# Patient Record
Sex: FEMALE | Race: WHITE | NOT HISPANIC OR LATINO | Employment: FULL TIME | URBAN - METROPOLITAN AREA
[De-identification: names, ages, dates, MRNs, and addresses within clinical notes are randomized per-mention and may not be internally consistent; named-entity substitution may affect disease eponyms.]

---

## 2017-09-28 ENCOUNTER — OFFICE VISIT (OUTPATIENT)
Dept: RETAIL CLINIC | Facility: CLINIC | Age: 22
End: 2017-09-28

## 2017-09-28 DIAGNOSIS — Z02.83 ENCOUNTER FOR DRUG SCREENING: Primary | ICD-10-CM

## 2020-02-14 ENCOUNTER — OFFICE VISIT (OUTPATIENT)
Dept: FAMILY MEDICINE CLINIC | Facility: CLINIC | Age: 25
End: 2020-02-14

## 2020-02-14 VITALS
HEART RATE: 75 BPM | WEIGHT: 134.6 LBS | HEIGHT: 69 IN | DIASTOLIC BLOOD PRESSURE: 62 MMHG | SYSTOLIC BLOOD PRESSURE: 112 MMHG | TEMPERATURE: 98 F | BODY MASS INDEX: 19.93 KG/M2 | OXYGEN SATURATION: 99 %

## 2020-02-14 DIAGNOSIS — R79.89 ELEVATED PROLACTIN LEVEL: ICD-10-CM

## 2020-02-14 DIAGNOSIS — E28.2 PCOS (POLYCYSTIC OVARIAN SYNDROME): ICD-10-CM

## 2020-02-14 DIAGNOSIS — R35.0 FREQUENCY OF URINATION: ICD-10-CM

## 2020-02-14 DIAGNOSIS — R19.8 ALTERNATING CONSTIPATION AND DIARRHEA: Primary | ICD-10-CM

## 2020-02-14 PROBLEM — R87.810 ASCUS WITH POSITIVE HIGH RISK HPV CERVICAL: Status: ACTIVE | Noted: 2018-08-01

## 2020-02-14 PROBLEM — R87.610 ASCUS WITH POSITIVE HIGH RISK HPV CERVICAL: Status: ACTIVE | Noted: 2018-08-01

## 2020-02-14 LAB
BILIRUB BLD-MCNC: NEGATIVE MG/DL
GLUCOSE UR STRIP-MCNC: NEGATIVE MG/DL
KETONES UR QL: ABNORMAL
LEUKOCYTE EST, POC: NEGATIVE
NITRITE UR-MCNC: NEGATIVE MG/ML
PH UR: 7.5 [PH] (ref 5–8)
PROT UR STRIP-MCNC: ABNORMAL MG/DL
RBC # UR STRIP: NEGATIVE /UL
SP GR UR: 1.01 (ref 1–1.03)
UROBILINOGEN UR QL: NORMAL

## 2020-02-14 PROCEDURE — 99214 OFFICE O/P EST MOD 30 MIN: CPT | Performed by: NURSE PRACTITIONER

## 2020-02-14 PROCEDURE — 81002 URINALYSIS NONAUTO W/O SCOPE: CPT | Performed by: NURSE PRACTITIONER

## 2020-02-14 RX ORDER — MEDROXYPROGESTERONE ACETATE 10 MG/1
TABLET ORAL
COMMUNITY
Start: 2020-01-28 | End: 2020-10-16

## 2020-02-14 NOTE — PROGRESS NOTES
Subjective   Mariama Mallory is a 24 y.o. female.     Chief Complaint   Patient presents with   • Establish Care   • GI Problem     shaky, sweaty        History of Present Illness     Patient is her today to establish care as a new patient.  She was previous with a doctor in [place].  Has lived here 5 years and is just establishing with me as PCP.       PCOS- on provera recently diagnosed.  Will probably do 4 times yearly.  Sees julianna in [place].    She also just diagnosed her with elevated prolactin levels and is working her up for that.      Always had irregular periods, then was on several forms of birth control.  When came off, she wasn't having periods at all.  This took her to ob/gyn and got her diagnosed with pcos.  When she had Nexplanon removed in august she lost 8 lbs in a week and then has lost 15 lbs all together since august without trying.    Before that she cut out grains.      Has had a couple occasion with each hand falling asleep and then once with arm falling asleep in the middle of the night.     Once in the past 2 weeks had a sweaty issue and hands shaking  Then she played volleyball and had another shaking episode.       Past several years had GI issues.  Has tried to cut out dairy in last 3 years. This seemed to helped, but has chronic constipation or diarrhea that comes and goes.     She cut out grains in august and improved, but is still inconsistent.     Urination: goes 11-15 times during the day and then 1-2 times during the night gets up to urinate  Drinks about 48 ounces water daily and then 1 cup coffee daily.        The following portions of the patient's history were reviewed and updated as appropriate: allergies, current medications, past family history, past medical history, past social history, past surgical history and problem list.    Past Medical History:   Diagnosis Date   • Elevated prolactin level (CMS/[facility])    • PCOS (polycystic ovarian syndrome)        Past Surgical  "History:   Procedure Laterality Date   • DENTAL PROCEDURE         Family History   Problem Relation Age of Onset   • No Known Problems Mother    • No Known Problems Father    • Diabetes type II Paternal Grandmother    • Diabetes type II Other         aunt       Social History     Socioeconomic History   • Marital status:      Spouse name: Not on file   • Number of children: Not on file   • Years of education: Not on file   • Highest education level: Not on file   Tobacco Use   • Smoking status: Never Smoker   • Smokeless tobacco: Never Used   Substance and Sexual Activity   • Alcohol use: No   • Drug use: Never   • Sexual activity: Yes     Partners: Male     Birth control/protection: None         Current Outpatient Medications:   •  medroxyPROGESTERone (PROVERA) 10 MG tablet, TAKE ONE TABLET BY MOUTH DAILY FOR 10 DAYS, Disp: , Rfl:     Review of Systems   Constitutional: Negative for fatigue.   Respiratory: Negative for cough, shortness of breath and wheezing.    Cardiovascular: Negative for chest pain.   Gastrointestinal: Positive for constipation, diarrhea and nausea (occassionally). Negative for abdominal pain, blood in stool, vomiting, GERD and indigestion.   Genitourinary: Positive for frequency. Negative for dysuria, urgency, vaginal discharge and vaginal pain.        Ovulation pain on the right side   Skin: Negative.    Neurological: Positive for dizziness (with shakiness x 2 times). Negative for syncope and headache.   Psychiatric/Behavioral: Negative for suicidal ideas and depressed mood. The patient is not nervous/anxious.        Objective   Vitals:    02/14/20 1621   BP: 112/62   Pulse: 75   Temp: 98 °F (36.7 °C)   SpO2: 99%   Weight: 61.1 kg (134 lb 9.6 oz)   Height: 175.3 cm (69\")     Body mass index is 19.88 kg/m².  Physical Exam   Constitutional: She is oriented to person, place, and time. She appears well-developed and well-nourished.   Cardiovascular: Normal rate, regular rhythm, normal heart " sounds and intact distal pulses.   Pulmonary/Chest: Effort normal and breath sounds normal.   Abdominal: Soft. Bowel sounds are normal. There is no tenderness.   Neurological: She is alert and oriented to person, place, and time.   Skin: Skin is warm and dry.   Psychiatric: She has a normal mood and affect. Her behavior is normal. Judgment and thought content normal.         Assessment/Plan   Mariama was seen today for establish care and gi problem.    Diagnoses and all orders for this visit:    Alternating constipation and diarrhea  -     Ambulatory Referral to Gastroenterology  -     Hemoglobin A1c    Elevated prolactin level (CMS/HCC)  -     CBC & Differential  -     Comprehensive Metabolic Panel  -     Vitamin B12  -     Vitamin D 25 Hydroxy  -     Cancel: POC Glycosylated Hemoglobin (Hb A1C)  -     POCT urinalysis dipstick, manual  -     Hemoglobin A1c    PCOS (polycystic ovarian syndrome)  -     CBC & Differential  -     Comprehensive Metabolic Panel  -     Vitamin B12  -     Vitamin D 25 Hydroxy  -     Cancel: POC Glycosylated Hemoglobin (Hb A1C)  -     Hemoglobin A1c    Frequency of urination  -     CBC & Differential  -     Comprehensive Metabolic Panel  -     Vitamin B12  -     Vitamin D 25 Hydroxy  -     Cancel: POC Glycosylated Hemoglobin (Hb A1C)  -     POCT urinalysis dipstick, manual  -     Hemoglobin A1c      Discussed with patient the possible issues with her GI.  Since this is been going on for so long I would recommend that she get into GI for further evaluation.  Patient is agreeable to this.  I did discuss with her that they may possibly want to do a colonoscopy.  Discussed her with her with this but entail.    Discussed with her several issues that could be causing her symptoms.  We will start with a blood panel to rule out any issues.  We will go from there.  For frequency of urination I am going to check a UA even though she is not having any other symptoms for completeness sake.  Since she  "is having the shaking episodes I discussed it could be a blood sugar drop but I am going to check labs to see if this is an issue.    Discussed with her that weight loss could be attributed to her grain removal from diet, especially since she is replacing with healthy options.  I discussed with her that since she has removed grains and dairy I think GI would be the next step for that instead of an allergist.  The GI may recommend going to see allergist as well.    We will call with results of labs and any changes needed to plan of care.           Patient Instructions   Diet for Irritable Bowel Syndrome  When you have irritable bowel syndrome (IBS), it is very important to eat the foods and follow the eating habits that are best for your condition. IBS may cause various symptoms such as pain in the abdomen, constipation, or diarrhea. Choosing the right foods can help to ease the discomfort from these symptoms. Work with your health care provider and diet and nutrition specialist (dietitian) to find the eating plan that will help to control your symptoms.  What are tips for following this plan?         · Keep a food diary. This will help you identify foods that cause symptoms. Write down:  ? What you eat and when you eat it.  ? What symptoms you have.  ? When symptoms occur in relation to your meals, such as \"pain in abdomen 2 hours after dinner.\"  · Eat your meals slowly and in a relaxed setting.  · Aim to eat 5-6 small meals per day. Do not skip meals.  · Drink enough fluid to keep your urine pale yellow.  · Ask your health care provider if you should take an over-the-counter probiotic to help restore healthy bacteria in your gut (digestive tract).  ? Probiotics are foods that contain good bacteria and yeasts.  · Your dietitian may have specific dietary recommendations for you based on your symptoms. He or she may recommend that you:  ? Avoid foods that cause symptoms. Talk with your dietitian about other ways to " get the same nutrients that are in those problem foods.  ? Avoid foods with gluten. Gluten is a protein that is found in rye, wheat, and barley.  ? Eat more foods that contain soluble fiber. Examples of foods with high soluble fiber include oats, seeds, and certain fruits and vegetables. Take a fiber supplement if directed by your dietitian.  ? Reduce or avoid certain foods called FODMAPs. These are foods that contain carbohydrates that are hard to digest. Ask your doctor which foods contain these carbohydrates.  What foods are not recommended?  The following are some foods and drinks that may make your symptoms worse:  · Fatty foods, such as french fries.  · Foods that contain gluten, such as pasta and cereal.  · Dairy products, such as milk, cheese, and ice cream.  · Chocolate.  · Alcohol.  · Products with caffeine, such as coffee.  · Carbonated drinks, such as soda.  · Foods that are high in FODMAPs. These include certain fruits and vegetables.  · Products with sweeteners such as honey, high fructose corn syrup, sorbitol, and mannitol.  The items listed above may not be a complete list of foods and beverages you should avoid. Contact a dietitian for more information.  What foods are good sources of fiber?  Your health care provider or dietitian may recommend that you eat more foods that contain fiber. Fiber can help to reduce constipation and other IBS symptoms. Add foods with fiber to your diet a little at a time so your body can get used to them. Too much fiber at one time might cause gas and swelling of your abdomen. The following are some foods that are good sources of fiber:  · Berries, such as raspberries, strawberries, and blueberries.  · Tomatoes.  · Carrots.  · Brown rice.  · Oats.  · Seeds, such as marcy and pumpkin seeds.  The items listed above may not be a complete list of recommended sources of fiber. Contact your dietitian for more options.  Where to find more information  · International Foundation  for Functional Gastrointestinal Disorders: www.iffgd.org  · National [place] of Diabetes and Digestive and Kidney Diseases: www.niddk.nih.gov  Summary  · When you have irritable bowel syndrome (IBS), it is very important to eat the foods and follow the eating habits that are best for your condition.  · IBS may cause various symptoms such as pain in the abdomen, constipation, or diarrhea.  · Choosing the right foods can help to ease the discomfort that comes from symptoms.  · Keep a food diary. This will help you identify foods that cause symptoms.  · Your health care provider or diet and nutrition specialist (dietitian) may recommend that you eat more foods that contain fiber.  This information is not intended to replace advice given to you by your health care provider. Make sure you discuss any questions you have with your health care provider.  Document Released: 03/09/2005 Document Revised: 07/15/2019 Document Reviewed: 08/21/2018  [employer] Interactive Patient Education © 2019 [employer]        Prolactin Level Test  Why am I having this test?  The prolactin level test is often used to diagnose and monitor problems with the pituitary gland, such as pituitary tumors. It may also be used to help find the cause of certain other conditions, such as an abnormal absence of menstrual cycles (amenorrhea) or a thyroid gland that does not produce enough hormones (hypothyroidism).  Your health care provider may order this test if you have:  · Irregular menstrual periods.  · Loss of libido.  · Milky fluid coming from your nipples (when not breastfeeding).  · Fatigue.  What is being tested?  This test measures the amount of prolactin in your blood. Prolactin is a hormone that is produced by the pituitary gland. Prolactin levels normally go up and down (fluctuate) due to stress, illness, trauma, or surgery. Increased levels can also be caused by tumors or other health problems.  What kind of sample is taken?    A blood  sample is required for this test. It is usually collected by inserting a needle into a blood vessel.  Tell a health care provider about:  · All medicines you are taking, including vitamins, herbs, eye drops, creams, and over-the-counter medicines.  How are the results reported?  Your test results will be reported as values that indicate the amount of prolactin in your blood. Your health care provider will compare your results to normal ranges that were established after testing a large group of people (reference ranges). Reference ranges may vary among labs and hospitals. For this test, common reference ranges are:  · Adult male: 3-13 ng/mL.  · Adult female: 3-27 ng/mL.  · Pregnant female:  ng/mL.  What do the results mean?  Increased levels of prolactin may mean that you have:  · A pituitary gland tumor.  · Amenorrhea.  · Hypothyroidism.  · Certain pituitary or reproductive syndromes.  · Kidney failure.  Decreased levels of prolactin may indicate:  · Lack of blood to the pituitary gland.  · Pituitary gland failure.  Talk with your health care provider about what your results mean.  Questions to ask your health care provider  Ask your health care provider, or the department that is doing the test:  · When will my results be ready?  · How will I get my results?  · What are my treatment options?  · What other tests do I need?  · What are my next steps?  Summary  · The prolactin level test is often used to diagnose and monitor problems with the pituitary gland, such as pituitary tumors. It may also be used to help find the cause of certain other conditions, such as amenorrhea or hypothyroidism.  · This test measures the amount of prolactin in your blood. Prolactin is a hormone that is produced by the pituitary gland.  · Prolactin levels normally go up and down (fluctuate) due to stress, illness, trauma, or surgery. Increased levels can also be caused by tumors or other health problems.  · Talk with your health  care provider about what your results mean.  This information is not intended to replace advice given to you by your health care provider. Make sure you discuss any questions you have with your health care provider.  Document Released: 01/20/2006 Document Revised: 08/13/2018 Document Reviewed: 08/13/2018  [employer]vier Interactive Patient Education © 2019 [employer]

## 2020-02-14 NOTE — PATIENT INSTRUCTIONS
"Diet for Irritable Bowel Syndrome  When you have irritable bowel syndrome (IBS), it is very important to eat the foods and follow the eating habits that are best for your condition. IBS may cause various symptoms such as pain in the abdomen, constipation, or diarrhea. Choosing the right foods can help to ease the discomfort from these symptoms. Work with your health care provider and diet and nutrition specialist (dietitian) to find the eating plan that will help to control your symptoms.  What are tips for following this plan?         · Keep a food diary. This will help you identify foods that cause symptoms. Write down:  ? What you eat and when you eat it.  ? What symptoms you have.  ? When symptoms occur in relation to your meals, such as \"pain in abdomen 2 hours after dinner.\"  · Eat your meals slowly and in a relaxed setting.  · Aim to eat 5-6 small meals per day. Do not skip meals.  · Drink enough fluid to keep your urine pale yellow.  · Ask your health care provider if you should take an over-the-counter probiotic to help restore healthy bacteria in your gut (digestive tract).  ? Probiotics are foods that contain good bacteria and yeasts.  · Your dietitian may have specific dietary recommendations for you based on your symptoms. He or she may recommend that you:  ? Avoid foods that cause symptoms. Talk with your dietitian about other ways to get the same nutrients that are in those problem foods.  ? Avoid foods with gluten. Gluten is a protein that is found in rye, wheat, and barley.  ? Eat more foods that contain soluble fiber. Examples of foods with high soluble fiber include oats, seeds, and certain fruits and vegetables. Take a fiber supplement if directed by your dietitian.  ? Reduce or avoid certain foods called FODMAPs. These are foods that contain carbohydrates that are hard to digest. Ask your doctor which foods contain these carbohydrates.  What foods are not recommended?  The following are some " foods and drinks that may make your symptoms worse:  · Fatty foods, such as french fries.  · Foods that contain gluten, such as pasta and cereal.  · Dairy products, such as milk, cheese, and ice cream.  · Chocolate.  · Alcohol.  · Products with caffeine, such as coffee.  · Carbonated drinks, such as soda.  · Foods that are high in FODMAPs. These include certain fruits and vegetables.  · Products with sweeteners such as honey, high fructose corn syrup, sorbitol, and mannitol.  The items listed above may not be a complete list of foods and beverages you should avoid. Contact a dietitian for more information.  What foods are good sources of fiber?  Your health care provider or dietitian may recommend that you eat more foods that contain fiber. Fiber can help to reduce constipation and other IBS symptoms. Add foods with fiber to your diet a little at a time so your body can get used to them. Too much fiber at one time might cause gas and swelling of your abdomen. The following are some foods that are good sources of fiber:  · Berries, such as raspberries, strawberries, and blueberries.  · Tomatoes.  · Carrots.  · Brown rice.  · Oats.  · Seeds, such as marcy and pumpkin seeds.  The items listed above may not be a complete list of recommended sources of fiber. Contact your dietitian for more options.  Where to find more information  · International Foundation for Functional Gastrointestinal Disorders: www.iffgd.org  · National [place] of Diabetes and Digestive and Kidney Diseases: www.niddk.nih.gov  Summary  · When you have irritable bowel syndrome (IBS), it is very important to eat the foods and follow the eating habits that are best for your condition.  · IBS may cause various symptoms such as pain in the abdomen, constipation, or diarrhea.  · Choosing the right foods can help to ease the discomfort that comes from symptoms.  · Keep a food diary. This will help you identify foods that cause symptoms.  · Your health  care provider or diet and nutrition specialist (dietitian) may recommend that you eat more foods that contain fiber.  This information is not intended to replace advice given to you by your health care provider. Make sure you discuss any questions you have with your health care provider.  Document Released: 03/09/2005 Document Revised: 07/15/2019 Document Reviewed: 08/21/2018  [employer] Interactive Patient Education © 2019 [employer].        Prolactin Level Test  Why am I having this test?  The prolactin level test is often used to diagnose and monitor problems with the pituitary gland, such as pituitary tumors. It may also be used to help find the cause of certain other conditions, such as an abnormal absence of menstrual cycles (amenorrhea) or a thyroid gland that does not produce enough hormones (hypothyroidism).  Your health care provider may order this test if you have:  · Irregular menstrual periods.  · Loss of libido.  · Milky fluid coming from your nipples (when not breastfeeding).  · Fatigue.  What is being tested?  This test measures the amount of prolactin in your blood. Prolactin is a hormone that is produced by the pituitary gland. Prolactin levels normally go up and down (fluctuate) due to stress, illness, trauma, or surgery. Increased levels can also be caused by tumors or other health problems.  What kind of sample is taken?    A blood sample is required for this test. It is usually collected by inserting a needle into a blood vessel.  Tell a health care provider about:  · All medicines you are taking, including vitamins, herbs, eye drops, creams, and over-the-counter medicines.  How are the results reported?  Your test results will be reported as values that indicate the amount of prolactin in your blood. Your health care provider will compare your results to normal ranges that were established after testing a large group of people (reference ranges). Reference ranges may vary among labs and  [facility]. For this test, common reference ranges are:  · Adult male: 3-13 ng/mL.  · Adult female: 3-27 ng/mL.  · Pregnant female:  ng/mL.  What do the results mean?  Increased levels of prolactin may mean that you have:  · A pituitary gland tumor.  · Amenorrhea.  · Hypothyroidism.  · Certain pituitary or reproductive syndromes.  · Kidney failure.  Decreased levels of prolactin may indicate:  · Lack of blood to the pituitary gland.  · Pituitary gland failure.  Talk with your health care provider about what your results mean.  Questions to ask your health care provider  Ask your health care provider, or the department that is doing the test:  · When will my results be ready?  · How will I get my results?  · What are my treatment options?  · What other tests do I need?  · What are my next steps?  Summary  · The prolactin level test is often used to diagnose and monitor problems with the pituitary gland, such as pituitary tumors. It may also be used to help find the cause of certain other conditions, such as amenorrhea or hypothyroidism.  · This test measures the amount of prolactin in your blood. Prolactin is a hormone that is produced by the pituitary gland.  · Prolactin levels normally go up and down (fluctuate) due to stress, illness, trauma, or surgery. Increased levels can also be caused by tumors or other health problems.  · Talk with your health care provider about what your results mean.  This information is not intended to replace advice given to you by your health care provider. Make sure you discuss any questions you have with your health care provider.  Document Released: 01/20/2006 Document Revised: 08/13/2018 Document Reviewed: 08/13/2018  [employer] Interactive Patient Education © 2019 [employer].

## 2020-02-15 LAB
25(OH)D3+25(OH)D2 SERPL-MCNC: 32.4 NG/ML (ref 30–100)
ALBUMIN SERPL-MCNC: 4.7 G/DL (ref 3.9–5)
ALBUMIN/GLOB SERPL: 1.8 {RATIO} (ref 1.2–2.2)
ALP SERPL-CCNC: 42 IU/L (ref 39–117)
ALT SERPL-CCNC: 11 IU/L (ref 0–32)
AST SERPL-CCNC: 17 IU/L (ref 0–40)
BASOPHILS # BLD AUTO: 0 X10E3/UL (ref 0–0.2)
BASOPHILS NFR BLD AUTO: 1 %
BILIRUB SERPL-MCNC: 0.6 MG/DL (ref 0–1.2)
BUN SERPL-MCNC: 20 MG/DL (ref 6–20)
BUN/CREAT SERPL: 22 (ref 9–23)
CALCIUM SERPL-MCNC: 9.3 MG/DL (ref 8.7–10.2)
CHLORIDE SERPL-SCNC: 103 MMOL/L (ref 96–106)
CO2 SERPL-SCNC: 23 MMOL/L (ref 20–29)
CREAT SERPL-MCNC: 0.93 MG/DL (ref 0.57–1)
EOSINOPHIL # BLD AUTO: 0.1 X10E3/UL (ref 0–0.4)
EOSINOPHIL NFR BLD AUTO: 1 %
ERYTHROCYTE [DISTWIDTH] IN BLOOD BY AUTOMATED COUNT: 12.3 % (ref 11.7–15.4)
GLOBULIN SER CALC-MCNC: 2.6 G/DL (ref 1.5–4.5)
GLUCOSE SERPL-MCNC: 94 MG/DL (ref 65–99)
HCT VFR BLD AUTO: 41.4 % (ref 34–46.6)
HGB BLD-MCNC: 13.4 G/DL (ref 11.1–15.9)
IMM GRANULOCYTES # BLD AUTO: 0 X10E3/UL (ref 0–0.1)
IMM GRANULOCYTES NFR BLD AUTO: 0 %
LYMPHOCYTES # BLD AUTO: 1.9 X10E3/UL (ref 0.7–3.1)
LYMPHOCYTES NFR BLD AUTO: 31 %
MCH RBC QN AUTO: 29.9 PG (ref 26.6–33)
MCHC RBC AUTO-ENTMCNC: 32.4 G/DL (ref 31.5–35.7)
MCV RBC AUTO: 92 FL (ref 79–97)
MONOCYTES # BLD AUTO: 0.7 X10E3/UL (ref 0.1–0.9)
MONOCYTES NFR BLD AUTO: 11 %
NEUTROPHILS # BLD AUTO: 3.5 X10E3/UL (ref 1.4–7)
NEUTROPHILS NFR BLD AUTO: 56 %
PLATELET # BLD AUTO: 122 X10E3/UL (ref 150–450)
POTASSIUM SERPL-SCNC: 4.5 MMOL/L (ref 3.5–5.2)
PROT SERPL-MCNC: 7.3 G/DL (ref 6–8.5)
RBC # BLD AUTO: 4.48 X10E6/UL (ref 3.77–5.28)
SODIUM SERPL-SCNC: 142 MMOL/L (ref 134–144)
VIT B12 SERPL-MCNC: 228 PG/ML (ref 232–1245)
WBC # BLD AUTO: 6.2 X10E3/UL (ref 3.4–10.8)

## 2020-02-18 LAB
HBA1C MFR BLD: 5.1 % (ref 4.8–5.6)
WRITTEN AUTHORIZATION: NORMAL

## 2020-02-19 ENCOUNTER — TELEPHONE (OUTPATIENT)
Dept: FAMILY MEDICINE CLINIC | Facility: CLINIC | Age: 25
End: 2020-02-19

## 2020-02-19 ENCOUNTER — PATIENT MESSAGE (OUTPATIENT)
Dept: FAMILY MEDICINE CLINIC | Facility: CLINIC | Age: 25
End: 2020-02-19

## 2020-02-19 DIAGNOSIS — D69.1 ABNORMAL PLATELETS (HCC): Primary | ICD-10-CM

## 2020-02-19 NOTE — TELEPHONE ENCOUNTER
"From: Mariama Mallory  To: Aries Ruelas, APRN  Sent: 2/19/2020 2:16 PM EST  Subject: Test Results Question    Dr. Aries Ruelas,     Thank you for getting back to me in regard to my test results and the B12 and lower Platelet levels.     I wanted to know if you had any thoughts on the following results.. ..     URINE Test    Protein - \"Trace\"    Ketones - \"Trace\"     COMP METABOLIC   BUN- \"20\" (which appears to be on the higher end)    Creatinine - \".93\" (Which appears to be on the higher end)    BUN/Creatinine Ration - \"22\" (Which appears to be on the higher end as well)     Let me know if you have any insight into this?     Thank you again,     Lindsay Mallory  "

## 2020-02-20 DIAGNOSIS — R35.0 FREQUENCY OF URINATION: Primary | ICD-10-CM

## 2020-03-06 ENCOUNTER — RESULTS ENCOUNTER (OUTPATIENT)
Dept: FAMILY MEDICINE CLINIC | Facility: CLINIC | Age: 25
End: 2020-03-06

## 2020-03-06 DIAGNOSIS — R35.0 FREQUENCY OF URINATION: ICD-10-CM

## 2020-03-06 DIAGNOSIS — D69.1 ABNORMAL PLATELETS (HCC): ICD-10-CM

## 2020-03-07 LAB
ALBUMIN SERPL-MCNC: 4.7 G/DL (ref 3.9–5)
ALBUMIN/GLOB SERPL: 2.2 {RATIO} (ref 1.2–2.2)
ALP SERPL-CCNC: 41 IU/L (ref 39–117)
ALT SERPL-CCNC: 8 IU/L (ref 0–32)
AST SERPL-CCNC: 14 IU/L (ref 0–40)
BASOPHILS # BLD AUTO: 0 X10E3/UL (ref 0–0.2)
BASOPHILS NFR BLD AUTO: 1 %
BILIRUB SERPL-MCNC: 0.3 MG/DL (ref 0–1.2)
BUN SERPL-MCNC: 16 MG/DL (ref 6–20)
BUN/CREAT SERPL: 20 (ref 9–23)
CALCIUM SERPL-MCNC: 9 MG/DL (ref 8.7–10.2)
CHLORIDE SERPL-SCNC: 104 MMOL/L (ref 96–106)
CO2 SERPL-SCNC: 24 MMOL/L (ref 20–29)
CREAT SERPL-MCNC: 0.79 MG/DL (ref 0.57–1)
EOSINOPHIL # BLD AUTO: 0.1 X10E3/UL (ref 0–0.4)
EOSINOPHIL NFR BLD AUTO: 1 %
ERYTHROCYTE [DISTWIDTH] IN BLOOD BY AUTOMATED COUNT: 13.7 % (ref 11.7–15.4)
GLOBULIN SER CALC-MCNC: 2.1 G/DL (ref 1.5–4.5)
GLUCOSE SERPL-MCNC: 81 MG/DL (ref 65–99)
HCT VFR BLD AUTO: 39 % (ref 34–46.6)
HGB BLD-MCNC: 12.8 G/DL (ref 11.1–15.9)
IMM GRANULOCYTES # BLD AUTO: 0 X10E3/UL (ref 0–0.1)
IMM GRANULOCYTES NFR BLD AUTO: 0 %
LYMPHOCYTES # BLD AUTO: 1.7 X10E3/UL (ref 0.7–3.1)
LYMPHOCYTES NFR BLD AUTO: 28 %
MCH RBC QN AUTO: 29.7 PG (ref 26.6–33)
MCHC RBC AUTO-ENTMCNC: 32.8 G/DL (ref 31.5–35.7)
MCV RBC AUTO: 91 FL (ref 79–97)
MONOCYTES # BLD AUTO: 0.6 X10E3/UL (ref 0.1–0.9)
MONOCYTES NFR BLD AUTO: 11 %
NEUTROPHILS # BLD AUTO: 3.6 X10E3/UL (ref 1.4–7)
NEUTROPHILS NFR BLD AUTO: 59 %
PLATELET # BLD AUTO: 166 X10E3/UL (ref 150–450)
POTASSIUM SERPL-SCNC: 4 MMOL/L (ref 3.5–5.2)
PROT SERPL-MCNC: 6.8 G/DL (ref 6–8.5)
RBC # BLD AUTO: 4.31 X10E6/UL (ref 3.77–5.28)
SODIUM SERPL-SCNC: 141 MMOL/L (ref 134–144)
WBC # BLD AUTO: 6 X10E3/UL (ref 3.4–10.8)

## 2020-03-16 ENCOUNTER — OFFICE VISIT (OUTPATIENT)
Dept: GASTROENTEROLOGY | Facility: CLINIC | Age: 25
End: 2020-03-16

## 2020-03-16 VITALS
BODY MASS INDEX: 19.99 KG/M2 | TEMPERATURE: 98.6 F | HEART RATE: 67 BPM | SYSTOLIC BLOOD PRESSURE: 100 MMHG | WEIGHT: 135 LBS | OXYGEN SATURATION: 100 % | HEIGHT: 69 IN | DIASTOLIC BLOOD PRESSURE: 78 MMHG

## 2020-03-16 DIAGNOSIS — R10.9 LEFT SIDED ABDOMINAL PAIN: ICD-10-CM

## 2020-03-16 DIAGNOSIS — K58.0 IRRITABLE BOWEL SYNDROME WITH DIARRHEA: Primary | ICD-10-CM

## 2020-03-16 DIAGNOSIS — R19.8 ALTERNATING CONSTIPATION AND DIARRHEA: ICD-10-CM

## 2020-03-16 DIAGNOSIS — R14.0 ABDOMINAL BLOATING: ICD-10-CM

## 2020-03-16 PROCEDURE — 99204 OFFICE O/P NEW MOD 45 MIN: CPT | Performed by: INTERNAL MEDICINE

## 2020-03-16 NOTE — PATIENT INSTRUCTIONS
Check lab work today to check for Celiac disease.    For IBS-D, complete course of Xifaxan as prescribed.    Recommend trial of low fodmap diet.    Follow up in 4 weeks for reassessment.      Low-FODMAP Eating Plan    FODMAPs (fermentable oligosaccharides, disaccharides, monosaccharides, and polyols) are sugars that are hard for some people to digest. A low-FODMAP eating plan may help some people who have bowel (intestinal) diseases to manage their symptoms.  This meal plan can be complicated to follow. Work with a diet and nutrition specialist (dietitian) to make a low-FODMAP eating plan that is right for you. A dietitian can make sure that you get enough nutrition from this diet.  What are tips for following this plan?  Reading food labels  · Check labels for hidden FODMAPs such as:  ? High-fructose syrup.  ? Honey.  ? Agave.  ? Natural fruit flavors.  ? Onion or garlic powder.  · Choose low-FODMAP foods that contain 3-4 grams of fiber per serving.  · Check food labels for serving sizes. Eat only one serving at a time to make sure FODMAP levels stay low.  Meal planning  · Follow a low-FODMAP eating plan for up to 6 weeks, or as told by your health care provider or dietitian.  · To follow the eating plan:  1. Eliminate high-FODMAP foods from your diet completely.  2. Gradually reintroduce high-FODMAP foods into your diet one at a time. Most people should wait a few days after introducing one high-FODMAP food before they introduce the next high-FODMAP food. Your dietitian can recommend how quickly you may reintroduce foods.  3. Keep a daily record of what you eat and drink, and make note of any symptoms that you have after eating.  4. Review your daily record with a dietitian regularly. Your dietitian can help you identify which foods you can eat and which foods you should avoid.  General tips  · Drink enough fluid each day to keep your urine pale yellow.  · Avoid processed foods. These often have added sugar and  "may be high in FODMAPs.  · Avoid most dairy products, whole grains, and sweeteners.  · Work with a dietitian to make sure you get enough fiber in your diet.  Recommended foods  Grains  · Gluten-free grains, such as rice, oats, buckwheat, quinoa, corn, polenta, and millet. Gluten-free pasta, bread, or cereal. Rice noodles. Corn tortillas.  Vegetables  · Eggplant, zucchini, cucumber, peppers, green beans, Hume sprouts, bean sprouts, lettuce, arugula, kale, Swiss chard, spinach, adriano greens, bok gladys, summer squash, potato, and tomato. Limited amounts of corn, carrot, and sweet potato. Green parts of scallions.  Fruits  · Bananas, oranges, elizabeth, limes, blueberries, raspberries, strawberries, grapes, cantaloupe, honeydew melon, kiwi, papaya, passion fruit, and pineapple. Limited amounts of dried cranberries, banana chips, and shredded coconut.  Dairy  · Lactose-free milk, yogurt, and kefir. Lactose-free cottage cheese and ice cream. Non-dairy milks, such as almond, coconut, hemp, and rice milk. Yogurts made of non-dairy milks. Limited amounts of goat cheese, brie, mozzarella, parmesan, swiss, and other hard cheeses.  Meats and other protein foods  · Unseasoned beef, pork, poultry, or fish. Eggs. Chavez. Tofu (firm) and tempeh. Limited amounts of nuts and seeds, such as almonds, walnuts, brazil nuts, pecans, peanuts, pumpkin seeds, maryc seeds, and sunflower seeds.  Fats and oils  · Butter-free spreads. Vegetable oils, such as olive, canola, and sunflower oil.  Seasoning and other foods  · Artificial sweeteners with names that do not end in \"ol\" such as aspartame, saccharine, and stevia. Maple syrup, white table sugar, raw sugar, brown sugar, and molasses. Fresh basil, coriander, parsley, rosemary, and thyme.  Beverages  · Water and mineral water. Sugar-sweetened soft drinks. Small amounts of orange juice or cranberry juice. Black and green tea. Most dry doron. Coffee.  This may not be a complete list of " low-FODMAP foods. Talk with your dietitian for more information.  Foods to avoid  Grains  · Wheat, including kamut, durum, and semolina. Barley and bulgur. Couscous. Wheat-based cereals. Wheat noodles, bread, crackers, and pastries.  Vegetables  · Chicory root, artichoke, asparagus, cabbage, snow peas, sugar snap peas, mushrooms, and cauliflower. Onions, garlic, leeks, and the white part of scallions.  Fruits  · Fresh, dried, and juiced forms of apple, pear, watermelon, peach, plum, cherries, apricots, blackberries, boysenberries, figs, nectarines, and lien. Avocado.  Dairy  · Milk, yogurt, ice cream, and soft cheese. Cream and sour cream. Milk-based sauces. Custard.  Meats and other protein foods  · Fried or fatty meat. Sausage. Cashews and pistachios. Soybeans, baked beans, black beans, chickpeas, kidney beans, fede beans, navy beans, lentils, and split peas.  Seasoning and other foods  · Any sugar-free gum or candy. Foods that contain artificial sweeteners such as sorbitol, mannitol, isomalt, or xylitol. Foods that contain honey, high-fructose corn syrup, or agave. Bouillon, vegetable stock, beef stock, and chicken stock. Garlic and onion powder. Condiments made with onion, such as hummus, chutney, pickles, relish, salad dressing, and salsa. Tomato paste.  Beverages  · Chicory-based drinks. Coffee substitutes. Chamomile tea. Fennel tea. Sweet or fortified doron such as port or karri. Diet soft drinks made with isomalt, mannitol, maltitol, sorbitol, or xylitol. Apple, pear, and lien juice. Juices with high-fructose corn syrup.  This may not be a complete list of high-FODMAP foods. Talk with your dietitian to discuss what dietary choices are best for you.   Summary  · A low-FODMAP eating plan is a short-term diet that eliminates FODMAPs from your diet to help ease symptoms of certain bowel diseases.  · The eating plan usually lasts up to 6 weeks. After that, high-FODMAP foods are restarted gradually, one at a  time, so you can find out which may be causing symptoms.  · A low-FODMAP eating plan can be complicated. It is best to work with a dietitian who has experience with this type of plan.  This information is not intended to replace advice given to you by your health care provider. Make sure you discuss any questions you have with your health care provider.  Document Released: 08/14/2018 Document Revised: 08/14/2018 Document Reviewed: 08/14/2018  [employer] Interactive Patient Education © 2020 [employer]

## 2020-03-16 NOTE — PROGRESS NOTES
Constipation; Diarrhea; and GI Problem      HPI  Patient is a 24 year old female who presents today for evaluation.    Patient reports her bowel pattern alternates between diarrhea and constipation. Symptoms have been present for years. She reports dairy seems to make the constipation worse. She reports increased gas and bloating. She reports she has been more diarrhea predominant over the last few months.    She reports diarrhea when she is on her menstrual cycle. This is associated with fecal urgency. When she has the diarrhea, she will have around 5 bowel movements per day.    She reports intermittent abdominal pain located to her LLQ. She also at times notes a fluttering sensation to her LUQ.    She was recently found to have low b12 level.    She reports a 15 lb weight loss after coming off of nexplanon. She reports poor appetite at the time. She reports the weight loss was unintentional.    She does not know her family history.    No prior abdominal surgeries.        Review of Systems   Constitutional: Positive for unexpected weight change. Negative for appetite change, chills, diaphoresis, fatigue and fever.   HENT: Negative for dental problem, mouth sores, rhinorrhea, sore throat and voice change.    Eyes: Negative for pain, redness and visual disturbance.   Respiratory: Negative for cough, chest tightness and wheezing.    Cardiovascular: Negative for chest pain, palpitations and leg swelling.   Endocrine: Positive for polyuria. Negative for cold intolerance, heat intolerance, polydipsia and polyphagia.   Genitourinary: Positive for frequency. Negative for dysuria, hematuria and urgency.   Musculoskeletal: Negative for arthralgias, back pain, joint swelling, myalgias and neck pain.   Skin: Negative for rash.   Allergic/Immunologic: Negative for environmental allergies, food allergies and immunocompromised state.   Neurological: Negative for dizziness, seizures, weakness, numbness and headaches.    Hematological: Does not bruise/bleed easily.   Psychiatric/Behavioral: Negative for sleep disturbance. The patient is not nervous/anxious.         I have reviewed and confirmed the accuracy of the ROS as documented by the MA/LPN/RN VINI Fajardo     Problem List:    Patient Active Problem List   Diagnosis   • ASCUS with positive high risk HPV cervical       Medical History:    Past Medical History:   Diagnosis Date   • Elevated prolactin level (CMS/HCC)    • PCOS (polycystic ovarian syndrome)         Social History:    Social History     Socioeconomic History   • Marital status:      Spouse name: Not on file   • Number of children: Not on file   • Years of education: Not on file   • Highest education level: Not on file   Tobacco Use   • Smoking status: Never Smoker   • Smokeless tobacco: Never Used   Substance and Sexual Activity   • Alcohol use: No   • Drug use: Never   • Sexual activity: Yes     Partners: Male     Birth control/protection: None       Family History:   Family History   Problem Relation Age of Onset   • No Known Problems Mother    • No Known Problems Father    • Diabetes type II Paternal Grandmother    • Diabetes type II Other         aunt       Surgical History:   Past Surgical History:   Procedure Laterality Date   • DENTAL PROCEDURE           Current Outpatient Medications:   •  medroxyPROGESTERone (PROVERA) 10 MG tablet, TAKE ONE TABLET BY MOUTH DAILY FOR 10 DAYS, Disp: , Rfl:     Allergies: No Known Allergies     The following portions of the patient's history were reviewed and updated as appropriate: allergies, current medications, past family history, past medical history, past social history, past surgical history and problem list.    Vitals:    03/16/20 1446   BP: 100/78   Pulse: 67   Temp: 98.6 °F (37 °C)   SpO2: 100%         03/16/20  1446   Weight: 61.2 kg (135 lb)     Body mass index is 19.93 kg/m².      PHYSICAL EXAM:  Physical Exam   Constitutional: She appears  well-developed.   HENT:   Nose: Nose normal. No nasal deformity.   Eyes: No scleral icterus.   Neck: No tracheal deviation present.   Pulmonary/Chest: Effort normal and breath sounds normal. No respiratory distress.   Abdominal: Soft. Normal appearance and bowel sounds are normal. She exhibits no shifting dullness and no distension. There is no hepatosplenomegaly. There is no tenderness. There is no rigidity, no rebound and no guarding. No hernia.   Lymphadenopathy:   No periumbilical lymphadenopathy   Neurological: She is alert.   Skin: Skin is warm. No cyanosis.   Psychiatric: She has a normal mood and affect. Her behavior is normal.   Vitals reviewed.          Assessment/ Plan  Mariama was seen today for constipation, diarrhea and gi problem.    Diagnoses and all orders for this visit:    Irritable bowel syndrome with diarrhea    Alternating constipation and diarrhea    Left sided abdominal pain    Abdominal bloating      Check lab work today to check for Celiac disease.    For IBS-D, complete course of Xifaxan as prescribed.    For bloating, recommend trial of low FODMAP diet.        Return in about 4 weeks (around 4/13/2020).      Discussion:  Patient symptoms seem functional in origin.  She has ongoing and worsening issues with alternating constipation and diarrhea and bloating but predominantly now diarrhea especially after certain foods.  We will proceed with checking her celiac antibodies and will recommend a low FODMAP diet.  In addition, will complete a course of Xifaxan for likely IBS D based on her symptom complex as well as abdominal bloating which may be indicative of bacterial overgrowth that would respond to Xifaxan as well.  Patient is also vitamin B12 deficient which also can be seen with bacterial overgrowth..  We will see her back here in 4 weeks.  If no improvement and celiac antibodies are negative, would consider endoscopic evaluation with biopsies.    Note: Review and summarization of old  patient records in this note have been personally reviewed by me  And   Refer to After Visit Summary for details of patient counseling, education and instructions provided during the encounter    Documentation by Ruchi MARTINI acting as a scribe in the following sections on behalf of the billable provider: HPI, ROS, assessment, & plan.              Answers for HPI/ROS submitted by the patient on 3/14/2020   What is the primary reason for your visit?: Other  Please describe your symptoms.: -Gassy & Unable to pass gas (gas pains) , -Seemingly left sided turmoil/pain associated with feeling of need to pass gas in attempt of relief..,    -  Pushing on site of “upset” (left) is painful. (This is not everyday this comes and goes.) , -Constipation , -Diarrhea , -Dairy consumption causes constipation & gas pains, -Bread consumption causes abdominal pain, cramping &  gas pains, - Seemingly random nausea , - Seemingly random shaky hands, racing heart beat and unclear thoughts (4 times in last 5 months)  Have you had these symptoms before?: Yes  How long have you been having these symptoms?: Other  Please list any medications you are currently taking for this condition.: B12 supplement (500mcg/day)  Please describe any probable cause for these symptoms. : Food consumption:,    Dairy/Bread Consumption or other foods (unknown) ,     Hormone imbalance?-  (Polycystic Ovarian Syndrome, Increased Prolactin levels, irregular  menstrual cycles)

## 2020-03-17 LAB
ENDOMYSIUM IGA SER QL: NEGATIVE
IGA SERPL-MCNC: 217 MG/DL (ref 87–352)
TTG IGA SER-ACNC: <2 U/ML (ref 0–3)

## 2020-10-16 ENCOUNTER — OFFICE VISIT (OUTPATIENT)
Dept: GASTROENTEROLOGY | Facility: CLINIC | Age: 25
End: 2020-10-16

## 2020-10-16 ENCOUNTER — HOSPITAL ENCOUNTER (OUTPATIENT)
Dept: ULTRASOUND IMAGING | Facility: HOSPITAL | Age: 25
Discharge: HOME | End: 2020-10-16
Admitting: NURSE PRACTITIONER

## 2020-10-16 VITALS
WEIGHT: 142 LBS | HEIGHT: 69 IN | SYSTOLIC BLOOD PRESSURE: 110 MMHG | BODY MASS INDEX: 21.03 KG/M2 | DIASTOLIC BLOOD PRESSURE: 72 MMHG | TEMPERATURE: 98 F | HEART RATE: 61 BPM | OXYGEN SATURATION: 99 %

## 2020-10-16 DIAGNOSIS — R11.0 NAUSEA: ICD-10-CM

## 2020-10-16 DIAGNOSIS — R10.11 RIGHT UPPER QUADRANT ABDOMINAL PAIN: Primary | ICD-10-CM

## 2020-10-16 DIAGNOSIS — R10.11 RIGHT UPPER QUADRANT ABDOMINAL PAIN: ICD-10-CM

## 2020-10-16 PROCEDURE — 99214 OFFICE O/P EST MOD 30 MIN: CPT | Performed by: NURSE PRACTITIONER

## 2020-10-16 PROCEDURE — 76705 ECHO EXAM OF ABDOMEN: CPT

## 2020-10-16 NOTE — PROGRESS NOTES
Chief Complaint   Patient presents with   • Abdominal Pain     Pt believes its coming from her gallbladder    • Nausea       HPI  Patient is a 25-year-old female who presents today for follow-up.    She was last seen in the office March 2020.  She been experiencing alternating bowel pattern, bloating, abdominal pain.  She was given a course of Xifaxan, recommended to try the low-fat diet and lab work was obtained to evaluate for celiac disease which was negative.    Patient presents today with concerns about abdominal pain.  Patient reports following her last office visit she followed a strict low FODMAP diet for 3 months and also took nutritional supplements.  In August she started reintroducing healthy fats.    Shortly after reintroducing these, she developed right upper quadrant pain that radiated to the back.  She reports a constant ache to her back and intermittent right upper quadrant heaviness or sharp pain.  This is associated with nausea.  Fatty foods make this worse.  The pain at times has been severe, last night it was so significant that she almost went to the emergency room.    She denies any heartburn or reflux.    She continues to report irregular bowel movements alternating between constipation and diarrhea but she had noted improvement in gas and bloating since following the FODMAP diet.  She denies any blood in the stool or dark stools.    Review of Systems   Constitutional: Positive for appetite change. Negative for chills, diaphoresis, fatigue, fever and unexpected weight change.   HENT: Negative for dental problem, ear pain, mouth sores, rhinorrhea, sore throat and voice change.    Eyes: Negative for pain, redness and visual disturbance.   Respiratory: Negative for cough, chest tightness and wheezing.    Cardiovascular: Negative for chest pain, palpitations and leg swelling.   Endocrine: Negative for cold intolerance, heat intolerance, polydipsia, polyphagia and polyuria.   Genitourinary:  Negative for dysuria, frequency, hematuria and urgency.   Musculoskeletal: Positive for arthralgias. Negative for back pain, joint swelling, myalgias and neck pain.   Skin: Negative for rash.   Allergic/Immunologic: Negative for environmental allergies, food allergies and immunocompromised state.   Neurological: Negative for dizziness, seizures, weakness, numbness and headaches.   Hematological: Does not bruise/bleed easily.   Psychiatric/Behavioral: Negative for sleep disturbance. The patient is not nervous/anxious.         Problem List:    Patient Active Problem List   Diagnosis   • ASCUS with positive high risk HPV cervical       Medical History:    Past Medical History:   Diagnosis Date   • Elevated prolactin level    • PCOS (polycystic ovarian syndrome)         Social History:    Social History     Socioeconomic History   • Marital status:      Spouse name: Not on file   • Number of children: Not on file   • Years of education: Not on file   • Highest education level: Not on file   Tobacco Use   • Smoking status: Never Smoker   • Smokeless tobacco: Never Used   Substance and Sexual Activity   • Alcohol use: No   • Drug use: Never   • Sexual activity: Yes     Partners: Male     Birth control/protection: None       Family History:   Family History   Problem Relation Age of Onset   • No Known Problems Mother    • No Known Problems Father    • Diabetes type II Paternal Grandmother    • Diabetes type II Other         aunt       Surgical History:   Past Surgical History:   Procedure Laterality Date   • DENTAL PROCEDURE         No current outpatient medications on file.    Allergies:  Patient has no known allergies.    The following portions of the patient's history were reviewed and updated as appropriate: allergies, current medications, past family history, past medical history, past social history, past surgical history and problem list.    Vitals:    10/16/20 0830   BP: 110/72   Pulse: 61   Temp: 98 °F  (36.7 °C)   SpO2: 99%         10/16/20  0830   Weight: 64.4 kg (142 lb)     Body mass index is 20.96 kg/m².    Physical Exam  Vitals signs reviewed.   Constitutional:       General: She is not in acute distress.     Appearance: She is well-developed.   HENT:      Head: Normocephalic and atraumatic.   Pulmonary:      Effort: Pulmonary effort is normal. No respiratory distress.   Abdominal:      General: Abdomen is flat. Bowel sounds are normal. There is no distension.      Palpations: Abdomen is soft.      Tenderness: There is no abdominal tenderness.   Skin:     General: Skin is dry.      Coloration: Skin is not pale.   Neurological:      Mental Status: She is alert and oriented to person, place, and time.   Psychiatric:         Thought Content: Thought content normal.           Assessment/ Plan  Diagnoses and all orders for this visit:    1. Right upper quadrant abdominal pain (Primary)  -     US Abdomen Limited; Future    2. Nausea  -     US Abdomen Limited; Future         Return if symptoms worsen or fail to improve.    Patient Instructions   Schedule right upper quadrant ultrasound to evaluate the gallbladder.    Further recommendations to be made pending results of ultrasound and clinical course.       Discussion:  Suspicion for biliary etiology of symptoms based off pain location and fatty foods making it worse.  Will initiate evaluation with an ultrasound.  If this is unremarkable, we will then schedule HIDA scan for further evaluation.  If ultrasound or HIDA scan come back abnormal, discussed with patient we will refer to surgery to discuss possible cholecystectomy.

## 2020-10-16 NOTE — PATIENT INSTRUCTIONS
Schedule right upper quadrant ultrasound to evaluate the gallbladder.    Further recommendations to be made pending results of ultrasound and clinical course.

## 2020-10-22 ENCOUNTER — APPOINTMENT (OUTPATIENT)
Dept: NUCLEAR MEDICINE | Facility: HOSPITAL | Age: 25
End: 2020-10-22

## 2020-10-23 ENCOUNTER — HOSPITAL ENCOUNTER (OUTPATIENT)
Dept: NUCLEAR MEDICINE | Facility: HOSPITAL | Age: 25
Discharge: HOME | End: 2020-10-23

## 2020-10-23 DIAGNOSIS — R10.11 RIGHT UPPER QUADRANT ABDOMINAL PAIN: ICD-10-CM

## 2020-10-23 DIAGNOSIS — R11.0 NAUSEA: ICD-10-CM

## 2020-10-23 PROCEDURE — 78227 HEPATOBIL SYST IMAGE W/DRUG: CPT

## 2020-10-23 PROCEDURE — 25010000002 SINCALIDE PER 5 MCG: Performed by: NURSE PRACTITIONER

## 2020-10-23 PROCEDURE — 0 TECHNETIUM TC 99M MEBROFENIN KIT: Performed by: NURSE PRACTITIONER

## 2020-10-23 PROCEDURE — A9537 TC99M MEBROFENIN: HCPCS | Performed by: NURSE PRACTITIONER

## 2020-10-23 RX ORDER — KIT FOR THE PREPARATION OF TECHNETIUM TC 99M MEBROFENIN 45 MG/10ML
1 INJECTION, POWDER, LYOPHILIZED, FOR SOLUTION INTRAVENOUS
Status: COMPLETED | OUTPATIENT
Start: 2020-10-23 | End: 2020-10-23

## 2020-10-23 RX ADMIN — SINCALIDE 1.3 MCG: 5 INJECTION, POWDER, LYOPHILIZED, FOR SOLUTION INTRAVENOUS at 08:15

## 2020-10-23 RX ADMIN — MEBROFENIN 1 DOSE: 45 INJECTION, POWDER, LYOPHILIZED, FOR SOLUTION INTRAVENOUS at 07:06

## 2020-10-26 ENCOUNTER — APPOINTMENT (OUTPATIENT)
Dept: NUCLEAR MEDICINE | Facility: HOSPITAL | Age: 25
End: 2020-10-26

## 2020-10-27 ENCOUNTER — PREP FOR SURGERY (OUTPATIENT)
Dept: OTHER | Facility: HOSPITAL | Age: 25
End: 2020-10-27

## 2020-10-27 DIAGNOSIS — R19.8 ALTERNATING CONSTIPATION AND DIARRHEA: ICD-10-CM

## 2020-10-27 DIAGNOSIS — R11.0 NAUSEA: ICD-10-CM

## 2020-10-27 DIAGNOSIS — R10.11 RIGHT UPPER QUADRANT ABDOMINAL PAIN: Primary | ICD-10-CM

## 2020-10-27 DIAGNOSIS — R19.4 CHANGE IN BOWEL HABIT: ICD-10-CM

## 2020-11-11 ENCOUNTER — PREP FOR SURGERY (OUTPATIENT)
Dept: OTHER | Facility: HOSPITAL | Age: 25
End: 2020-11-11

## 2020-11-11 DIAGNOSIS — R10.11 RIGHT UPPER QUADRANT ABDOMINAL PAIN: Primary | ICD-10-CM

## 2020-11-11 DIAGNOSIS — R11.0 NAUSEA: ICD-10-CM

## 2020-11-13 ENCOUNTER — LAB REQUISITION (OUTPATIENT)
Dept: LAB | Facility: HOSPITAL | Age: 25
End: 2020-11-13

## 2020-11-13 DIAGNOSIS — Z00.00 ENCOUNTER FOR GENERAL ADULT MEDICAL EXAMINATION WITHOUT ABNORMAL FINDINGS: ICD-10-CM

## 2020-11-14 PROCEDURE — U0004 COV-19 TEST NON-CDC HGH THRU: HCPCS | Performed by: INTERNAL MEDICINE

## 2020-11-16 LAB — SARS-COV-2 RNA RESP QL NAA+PROBE: NOT DETECTED

## 2020-11-17 ENCOUNTER — OUTSIDE FACILITY SERVICE (OUTPATIENT)
Dept: GASTROENTEROLOGY | Facility: CLINIC | Age: 25
End: 2020-11-17

## 2020-11-17 ENCOUNTER — LAB REQUISITION (OUTPATIENT)
Dept: LAB | Facility: HOSPITAL | Age: 25
End: 2020-11-17

## 2020-11-17 DIAGNOSIS — R10.11 RIGHT UPPER QUADRANT PAIN: ICD-10-CM

## 2020-11-17 PROCEDURE — 88305 TISSUE EXAM BY PATHOLOGIST: CPT | Performed by: INTERNAL MEDICINE

## 2020-11-17 PROCEDURE — 43239 EGD BIOPSY SINGLE/MULTIPLE: CPT | Performed by: INTERNAL MEDICINE

## 2020-11-17 PROCEDURE — 87081 CULTURE SCREEN ONLY: CPT | Performed by: INTERNAL MEDICINE

## 2020-11-18 LAB
CYTO UR: NORMAL
LAB AP CASE REPORT: NORMAL
LAB AP CLINICAL INFORMATION: NORMAL
LAB AP DIAGNOSIS COMMENT: NORMAL
PATH REPORT.FINAL DX SPEC: NORMAL
PATH REPORT.GROSS SPEC: NORMAL
UREASE TISS QL: NEGATIVE

## 2020-12-15 ENCOUNTER — OFFICE VISIT (OUTPATIENT)
Dept: GASTROENTEROLOGY | Facility: CLINIC | Age: 25
End: 2020-12-15

## 2020-12-15 VITALS
HEART RATE: 70 BPM | WEIGHT: 150.6 LBS | RESPIRATION RATE: 16 BRPM | HEIGHT: 69 IN | BODY MASS INDEX: 22.31 KG/M2 | TEMPERATURE: 98.4 F | DIASTOLIC BLOOD PRESSURE: 70 MMHG | SYSTOLIC BLOOD PRESSURE: 118 MMHG | OXYGEN SATURATION: 99 %

## 2020-12-15 DIAGNOSIS — R11.0 NAUSEA: ICD-10-CM

## 2020-12-15 DIAGNOSIS — R10.11 RIGHT UPPER QUADRANT ABDOMINAL PAIN: Primary | ICD-10-CM

## 2020-12-15 PROCEDURE — 99214 OFFICE O/P EST MOD 30 MIN: CPT | Performed by: NURSE PRACTITIONER

## 2020-12-15 NOTE — PATIENT INSTRUCTIONS
Proceed with Hydrogen Breath test as discussed.     Additional recommendations will be made based on results of the above test.

## 2020-12-15 NOTE — PROGRESS NOTES
Chief Complaint   Patient presents with   • Abdominal Pain     RUQ       HPI  Patient presents today for follow-up.  Last office visit October 16, 2020 with complaints of abdominal pain.  Pain started after reintroducing healthy fats when following a strict FODMAP diet for 3 months prior.  She has had gallbladder evaluation with HIDA scan, ultrasound and recent EGD, these have been summarized below.    Pain is located right upper quadrant and will radiate to her back.  This is described as constant ache with an intermittent heaviness and sharp pain.  There is associated nausea.  Pain is worse with fatty foods.  Pain at times has been severe.    She also has back pain that is worse with coconut oil, coconut milk and avocado. She describes a back pressure with a sensation of a balloon that is filled with water with water. This pain is contstant. She tries to avoid certain foods however pain persists but is worse with coconut products and avocado.    She has constant nausea. Denies early fullness.  No vomiting.     Previous complaints of gas and bloating have improved with FODMAP diet.  Bowel movements are now regular.  She denies melena or hematochezia.    She denies heartburn or reflux.    She is currently working with a nutritionist to help with symptoms Nutritional therapy practitioner KHALIF Arizmendi.  She has been running tests including a fecal calprotectin that patient states was elevated in the 150s and after FODMAP diet and other natural interventions has most recently been 7.  Patient states she also had hydrogen breath test done for bacterial overgrowth and this was very positive.  She was treated with natural remedies and repeat showed continued elevation but significantly less.  She also reports testing form H. pylori 3 weeks prior to her EGD that was positive but was negative on her recent EGD with our office.    REVIEW OF PREVIOUS RECORDS:  November 17, 2020.  EGD.  Patchy mild gastritis.   Examined esophagus and duodenum was normal, small bowel biopsies unremarkable.  Gastric biopsies with mild chronic inactive gastritis and reactive epithelial changes.  Negative for H. pylori.    HIDA scan October 2020 ejection fraction 87%.  Right upper quadrant ultrasound October 16, 2020, normal.    Review of Systems   Constitutional: Negative.    HENT: Negative.    Respiratory: Negative.    Cardiovascular: Negative.    Gastrointestinal: Positive for abdominal pain.   Endocrine: Positive for polydipsia.   Genitourinary: Positive for frequency.   Musculoskeletal: Negative.    Skin: Negative.    Allergic/Immunologic: Negative.    Hematological: Negative.    Psychiatric/Behavioral: Negative.         Problem List:    Patient Active Problem List   Diagnosis   • ASCUS with positive high risk HPV cervical       Medical History:    Past Medical History:   Diagnosis Date   • Elevated prolactin level    • PCOS (polycystic ovarian syndrome)         Social History:    Social History     Socioeconomic History   • Marital status:      Spouse name: Not on file   • Number of children: Not on file   • Years of education: Not on file   • Highest education level: Not on file   Tobacco Use   • Smoking status: Never Smoker   • Smokeless tobacco: Never Used   Substance and Sexual Activity   • Alcohol use: No   • Drug use: Never   • Sexual activity: Yes     Partners: Male     Birth control/protection: None       Family History:   Family History   Problem Relation Age of Onset   • No Known Problems Mother    • No Known Problems Father    • Diabetes type II Paternal Grandmother    • Diabetes type II Other         aunt       Surgical History:   Past Surgical History:   Procedure Laterality Date   • DENTAL PROCEDURE         No current outpatient medications on file.    Allergies:  Patient has no known allergies.    The following portions of the patient's history were reviewed and updated as appropriate: allergies, current  medications, past family history, past medical history, past social history, past surgical history and problem list.    Vitals:    12/15/20 1356   BP: 118/70   Pulse: 70   Resp: 16   Temp: 98.4 °F (36.9 °C)   SpO2: 99%         12/15/20  1356   Weight: 68.3 kg (150 lb 9.6 oz)     Body mass index is 22.24 kg/m².    Physical Exam  Vitals signs reviewed.   Constitutional:       General: She is not in acute distress.     Appearance: Normal appearance. She is normal weight. She is not ill-appearing.   Pulmonary:      Effort: Pulmonary effort is normal. No respiratory distress.   Abdominal:      General: Abdomen is flat. Bowel sounds are normal. There is no distension.      Palpations: Abdomen is soft. Abdomen is not rigid. There is no mass or pulsatile mass.      Tenderness: There is abdominal tenderness. There is no guarding or rebound.      Hernia: No hernia is present.   Neurological:      Mental Status: She is alert.           Assessment/ Plan  Diagnoses and all orders for this visit:    1. Right upper quadrant abdominal pain (Primary)  -     Cancel: Breath Hydrogen Test; Future  -     Breath Hydrogen Test; Future    2. Nausea  -     Cancel: Breath Hydrogen Test; Future  -     Breath Hydrogen Test; Future         No follow-ups on file.    Patient Instructions   Proceed with Hydrogen Breath test as discussed.     Additional recommendations will be made based on results of the above test.        Discussion:  Reviewed EGD, right upper quadrant ultrasound and HIDA scan, summarized above.    She reports previous hydrogen breath test that was positive with her  provider.  Would like to recheck this and see if it is still positive, to consider treatment with a medication like Xifaxan unless she refers a more natural treatment.  We will discuss treatment options with patient once hydrogen breath test has resulted.    Also based on hydrogen breath test results and ongoing symptoms, will discuss with Dr. Parker.  Also  based on clinical course, to consider surgical referral for consideration of cholecystectomy despite normal gallbladder evaluation as briefly discussed with patient.

## 2021-02-24 ENCOUNTER — OFFICE VISIT (OUTPATIENT)
Dept: FAMILY MEDICINE CLINIC | Facility: CLINIC | Age: 26
End: 2021-02-24

## 2021-02-24 VITALS
SYSTOLIC BLOOD PRESSURE: 110 MMHG | WEIGHT: 153.8 LBS | HEIGHT: 69 IN | OXYGEN SATURATION: 98 % | TEMPERATURE: 97.3 F | DIASTOLIC BLOOD PRESSURE: 70 MMHG | HEART RATE: 79 BPM | BODY MASS INDEX: 22.78 KG/M2

## 2021-02-24 DIAGNOSIS — N60.01 CYST OF RIGHT BREAST: Primary | ICD-10-CM

## 2021-02-24 PROCEDURE — 99213 OFFICE O/P EST LOW 20 MIN: CPT | Performed by: NURSE PRACTITIONER

## 2021-02-24 NOTE — PROGRESS NOTES
"Chief Complaint  Breast Pain (C.O LUMP RT BREAST )    Subjective          Mariama Mallory presents to [facility]  History of Present Illness     Patient is here today with complaint of lump that she first noticed on her right breast on 2/20/2021.  She denies any pain to the breast.  She denies any discharge from the nipple or the spot.  She hasn't noticed any types of cyst like this before.   She came off birth control and then has tried a nutritional diet. She thinks she is in her ovulation week. She has had 4 cycles 30-35 days apart and this is new for her.    No pregnancy's in the past.       Denies any breast cancer history in her family.         Review of Systems   Constitutional: Negative for fatigue and fever.   Respiratory: Negative for cough, shortness of breath and wheezing.    Cardiovascular: Negative for chest pain.   Gastrointestinal: Negative for abdominal pain, constipation, diarrhea, nausea and vomiting.   Genitourinary: Negative for dysuria and urgency.        Breast  Nodule right breast     Objective   Vital Signs:   /70 (BP Location: Right arm, Patient Position: Sitting, Cuff Size: Adult)   Pulse 79   Temp 97.3 °F (36.3 °C) (Temporal)   Ht 175.3 cm (69\")   Wt 69.8 kg (153 lb 12.8 oz)   SpO2 98%   BMI 22.71 kg/m²     Physical Exam  Constitutional:       Appearance: Normal appearance.   Chest:      Breasts:         Right: No tenderness.         Left: Normal. No tenderness.       Lymphadenopathy:      Upper Body:      Right upper body: No supraclavicular, axillary or pectoral adenopathy.      Left upper body: No supraclavicular, axillary or pectoral adenopathy.   Neurological:      Mental Status: She is alert and oriented to person, place, and time.   Psychiatric:         Mood and Affect: Mood normal.         Behavior: Behavior normal.         Thought Content: Thought content normal.         Judgment: Judgment normal.        Result Review :               "   Assessment and Plan    Diagnoses and all orders for this visit:    1. Cyst of right breast (Primary)  -     US Breast Right Complete; Future  -     Mammo Diagnostic Bilateral With CAD; Future      Cyst is easily mobile, but would like to get US for further evaluation. Patient is agreeable.  If any worsening pain, or symptoms or if increasing in size, notify provider.        Follow Up   No follow-ups on file.  Patient was given instructions and counseling regarding her condition or for health maintenance advice. Please see specific information pulled into the AVS if appropriate.

## 2021-03-17 DIAGNOSIS — N60.01 CYST OF RIGHT BREAST: ICD-10-CM

## 2021-03-18 NOTE — PROGRESS NOTES
Discussed negative result with patient.  She states that she feels spot which is movable and non tender just when sitting up.   Discussed if starts to be sore or growing or non movable or any other issues of concern to follow up with me for further evaluation. Patient verbalizes understanding.

## 2021-04-02 ENCOUNTER — TELEPHONE (OUTPATIENT)
Dept: FAMILY MEDICINE CLINIC | Facility: CLINIC | Age: 26
End: 2021-04-02

## 2021-04-02 NOTE — TELEPHONE ENCOUNTER
Left message per verbal release, to see if pt was going to complete mammogram that was ordered.  Will postpone for 1 month

## 2021-04-16 ENCOUNTER — BULK ORDERING (OUTPATIENT)
Dept: CASE MANAGEMENT | Facility: OTHER | Age: 26
End: 2021-04-16

## 2021-04-16 DIAGNOSIS — Z23 IMMUNIZATION DUE: ICD-10-CM

## 2022-01-27 ENCOUNTER — OFFICE VISIT (OUTPATIENT)
Dept: FAMILY MEDICINE CLINIC | Facility: CLINIC | Age: 27
End: 2022-01-27

## 2022-01-27 VITALS
DIASTOLIC BLOOD PRESSURE: 74 MMHG | TEMPERATURE: 96.9 F | WEIGHT: 150 LBS | HEIGHT: 69 IN | HEART RATE: 85 BPM | SYSTOLIC BLOOD PRESSURE: 114 MMHG | BODY MASS INDEX: 22.22 KG/M2 | OXYGEN SATURATION: 96 %

## 2022-01-27 DIAGNOSIS — H91.93 BILATERAL HEARING LOSS, UNSPECIFIED HEARING LOSS TYPE: Primary | ICD-10-CM

## 2022-01-27 PROCEDURE — 99213 OFFICE O/P EST LOW 20 MIN: CPT | Performed by: NURSE PRACTITIONER

## 2022-01-27 RX ORDER — VITAMIN A ACETATE, BETA CAROTENE, ASCORBIC ACID, CHOLECALCIFEROL, .ALPHA.-TOCOPHEROL ACETATE, DL-, THIAMINE MONONITRATE, RIBOFLAVIN, NIACINAMIDE, PYRIDOXINE HYDROCHLORIDE, FOLIC ACID, CYANOCOBALAMIN, CALCIUM CARBONATE, FERROUS FUMARATE, ZINC OXIDE, CUPRIC OXIDE 3080; 12; 120; 400; 1; 1.84; 3; 20; 22; 920; 25; 200; 27; 10; 2 [IU]/1; UG/1; MG/1; [IU]/1; MG/1; MG/1; MG/1; MG/1; MG/1; [IU]/1; MG/1; MG/1; MG/1; MG/1; MG/1
TABLET, FILM COATED ORAL DAILY
COMMUNITY

## 2022-01-27 RX ORDER — ONDANSETRON 8 MG/1
8 TABLET, ORALLY DISINTEGRATING ORAL
COMMUNITY
Start: 2021-12-23

## 2022-01-27 NOTE — PROGRESS NOTES
"Chief Complaint  Ear Problem (hearing loss)    Subjective          Mariama Mallory presents to [facility]  History of Present Illness     Patient verbalized consent to the visit recording.    Hearing loss  She admits that her  feels as if she has some mild hearing loss. She states she has noticed a difference within the last year. She believes she has a hard time understanding due to people wearing masks. She mentions she can not hear certain people or tones. She denies any ear pain or pressure.     Objective   Vital Signs:   /74   Pulse 85   Temp 96.9 °F (36.1 °C)   Ht 175.3 cm (69\")   Wt 68 kg (150 lb)   SpO2 96%   BMI 22.15 kg/m²     Physical Exam  Constitutional:       Appearance: Normal appearance.   HENT:      Head: Normocephalic and atraumatic.      Right Ear: Tympanic membrane has decreased mobility.      Left Ear: Tympanic membrane has decreased mobility.   Neurological:      Mental Status: She is alert and oriented to person, place, and time.   Psychiatric:         Mood and Affect: Mood normal.         Behavior: Behavior normal.         Thought Content: Thought content normal.         Judgment: Judgment normal.        Result Review :                 Assessment and Plan    Diagnoses and all orders for this visit:    1. Bilateral hearing loss, unspecified hearing loss type (Primary)          -     She does have some fluid in her ear. She would like to avoid any unnecessary medications due to her being pregnant. She would like to be referred to an ENT.    -     Ambulatory Referral to ENT (Otolaryngology)        Follow Up   No follow-ups on file.  Patient was given instructions and counseling regarding her condition or for health maintenance advice. Please see specific information pulled into the AVS if appropriate.     Transcribed from ambient dictation for VINI Tse by Eunice Hernandes.  01/27/22   15:16 EST        Answers for HPI/ROS submitted by the " patient on 1/27/2022  Please describe your symptoms.: Can hear but have trouble understanding seemingly certain voices  Have you had these symptoms before?: No  How long have you been having these symptoms?: Greater than 2 weeks  Please list any medications you are currently taking for this condition.: n/a  Please describe any probable cause for these symptoms. : Ear wax build up?  What is the primary reason for your visit?: Other

## 2023-01-19 ENCOUNTER — TREATMENT (OUTPATIENT)
Dept: PHYSICAL THERAPY | Facility: CLINIC | Age: 28
End: 2023-01-19

## 2023-01-19 DIAGNOSIS — M54.2 CERVICALGIA: Primary | ICD-10-CM

## 2023-01-19 DIAGNOSIS — M54.42 CHRONIC BILATERAL LOW BACK PAIN WITH LEFT-SIDED SCIATICA: ICD-10-CM

## 2023-01-19 DIAGNOSIS — G89.29 CHRONIC BILATERAL LOW BACK PAIN WITH LEFT-SIDED SCIATICA: ICD-10-CM

## 2023-01-19 PROCEDURE — PTSP1 PR CUSTOM PT EVALUTATION & TREATMENT OF SELF-PAY PT 1ST VISIT: Performed by: PHYSICAL THERAPIST

## 2023-01-19 NOTE — PROGRESS NOTES
Physical Therapy Initial Evaluation and Plan of Care      Patient: Mariama Mallory   : 1995  Diagnosis/ICD-10 Code:  Cervicalgia [M54.2]  Referring practitioner: No ref. provider found  Date of Initial Visit: 2023  Today's Date: 2023  Patient seen for 1 session  Location of Service: [facility]   [address] [facility] - [address] [place] [address]       Visit Diagnoses:    ICD-10-CM ICD-9-CM   1. Cervicalgia  M54.2 723.1   2. Chronic bilateral low back pain with left-sided sciatica  M54.42 724.2    G89.29 724.3     338.29         Subjective  Chief Complaint/Subjective Report: Patient presented to the clinic today with complaints of pain in the low back . Patient reported no significant medical history today aside from that previously mentioned; no reports of CNS signs or symptoms, or indications of other sinister pathologies were given in the subjective history today.  Mechanism of Injury: Unknown  Functional Limitations: ADLs, work-related activities  Subjective Goals for PT: Return to PLOF, decreased pain with ADLs and community activities  Prior Treatment for Current Condition: No  Imaging:No  Pain/VNRS (0-10/10): Worst: 6/10; Average: 2/10  Agg. Factors: Lifitng  Relieving Factors: -  Subjective Questionnaire: Oswestry: 16  PLOF: Independent with all functional tasks, ADLs, and community activities  Occupation:   Social:   PMH: See history section of patient chart  Precautions/Contraindications: No reported contraindications from subjective history today unless otherwise stated above.      Objective  WFL mobility  Hip MMT 4/5  + TTP in lumbar spine      Functional Assessment: Impaired tolerance to lifting    See Exercise, Manual, and Modality Logs for complete treatment.       Documentation of Assessment Details: Patient presented the clinic with signs and symptoms consistent with sciatica. Patient demonstrated limitations and impairments in neurodynamic mobility and  lumbar provocation tests during today's evaluation, and will benefit from skilled PT address current limitations and impairments to help patient regain functional mobility and strength necessary to return to PLOF, reduce pain, and improve current symptoms as patient progresses towards meeting current goals established at therapy today. The patient present with no comorbidities or personal factors that impact my POC and deficit in above mentioned areas. Based on these findings and results from valid tests and measures, I am classifying this patient's presentation as stable with uncomplicated characteristics, and a good prognosis for recovery.     Access Code: [email or web address]  URL: [email or web address]  Date: 01/19/2023  Prepared by: Marty Magana    Exercises  Supine Straight Leg Lumbar Rotation Stretch - 1 x daily - 7 x weekly - 3 sets - 10 reps  Supine Sciatic Nerve Glide - 1 x daily - 7 x weekly - 3 sets - 10 reps  Side Stepping with Resistance at Ankles - 1 x daily - 7 x weekly - 3 sets - 10 reps  Kettlebell Deadlift - 1 x daily - 7 x weekly - 3 sets - 10 reps  Forward T - 1 x daily - 7 x weekly - 3 sets - 10 reps  Half Kneeling Diagonal Chops with Resistance - 1 x daily - 7 x weekly - 3 sets - 10 reps  Standing Diagonal Chop - 1 x daily - 7 x weekly - 3 sets - 10 reps  Reverse Chop with Elbows Straight - 1 x daily - 7 x weekly - 3 sets - 10 reps  Half Kneeling Diagonal Lift with Narrow Balance - 1 x daily - 7 x weekly - 3 sets - 10 reps  Modified Side Plank with Hip Abduction and Resistance - 1 x daily - 7 x weekly - 3 sets - 10 reps  Side Plank on Elbow with Rotation - 1 x daily - 7 x weekly - 3 sets - 10 reps  Supine Single Leg Bridge on Box - 1 x daily - 7 x weekly - 3 sets - 10 reps  Anti-Rotation Sidestepping with Resistance - 1 x daily - 7 x weekly - 3 sets - 10 reps  Single Leg Balance with Alternating Floor Reaches - 1 x daily - 7 x weekly - 3 sets - 10 reps  Albino Curl (Needium it) - 1 x daily - 7 x  weekly - 3 sets - 10 reps  Side Stepping with Resistance at Ankles - 1-2 x daily - 7 x weekly - 3-4 sets - 12-15 steps (each way)      Assessment & Plan     Assessment  Impairments: abnormal gait, abnormal or restricted ROM, activity intolerance, impaired physical strength, lacks appropriate home exercise program and pain with function  Functional Limitations: carrying objects, lifting, sleeping, walking, uncomfortable because of pain, sitting and standingPrognosis details: Based on valid tests and measures performed today I am classifying this patient as presently stable with uncomplicated characteristics and good prognosis for recovery    Goals  Plan Goals: Pt will improve Subjective assessment by >75% within 6 weeks to demonstrate improvements in test and measure outcomes and overall functionality, and to show reduction of symptoms, improved activity tolerance, and ability to complete ADLs and work-related activities     Pt will improve functional mobility and pain free ROM to ranges that allow for pain free functional activities such as dressing, bathing, and completing ADLs within 8 weeks to demonstrate improvements in functional independence, mobility, and community participation to allow for a return to PLOF.    Pt will improve functional mobility and pain free ROM and functional strength to  allow for pain free functional activities such as safely squatting to retreive items from the floor and reaching overhead within 6 weeks to demonstrate improvements in functional independence, mobility, and community participation to allow for a return to PLOF.    Pt will demonstrate 80% full ROM for all measured ROMs within 8 weeks to demonstrate improvements in functional mobility and ability to complete ADLs and work-related activities Independently.    Pt will sleep through the night without waking d/t current symptoms >5/7 nights per week within 8 weeks to demonstrate improvements restful sleep, overall function,  and symptom reduction    Pt will report pain <2/10 at worst with activity and at rest within 8 weeks to demonstrate improvements in pain-free ROM and function to improve functional mobility, activities tolerance, and ability to complete ADLs and work-related activities    Pt will be able to lift and carry objects >30lbs without worsening of symptoms within 8 weeks to demonstrate improvements in functional mobility for ease of home and community tasks and improved functional independence.    Pt will be able to ambulate >60 mins independently without worsening of symptoms within 8 weeks to demonstrate improvements in functional mobility for ease of home and community ambulation and improved functional independence        Plan  Therapy options: will be seen for skilled therapy services  Planned modality interventions: dry needling, TENS, high voltage pulsed current (pain management), electrical stimulation/Russian stimulation and cryotherapy  Planned therapy interventions: ADL retraining, abdominal trunk stabilization, manual therapy, neuromuscular re-education, balance/weight-bearing training, body mechanics training, soft tissue mobilization, spinal/joint mobilization, joint mobilization, home exercise program, gait training, functional ROM exercises, strengthening, therapeutic activities and transfer training  Plan details: Duration: 2-3x/Wk for 4 Weeks - Upon completion of 4 weeks further evaluation and assessment with determine ongoing plan for continued care.    Continue with skilled physical therapy addressing previously mentioned limitations and impairments; progress HEP as tolerated; progress functional strengthening interventions to tolerance.        History # of Personal Factors and/or Comorbidities: LOW (0)  Examination of Body System(s): # of elements: LOW (1-2)  Clinical Presentation: STABLE   Clinical Decision Making: LOW       Timed:         Manual Therapy:    --     mins  72207;     Therapeutic  Exercise:    -     mins  53252;     Neuromuscular Neeraj:    -    mins  29343;    Therapeutic Activity:     -     mins  99318;     Gait Training:           mins  17976;     Ultrasound:          mins  52957;    Ionto                                  mins   39250  Self Care                           mins   49962  Canalith Repos         mins 24248    Un-Timed:  Electrical Stimulation:          mins  92143 ( );  Dry Needling         mins self-pay  Traction         mins 11380  Low Eval    -     Mins  14510  Mod Eval         Mins  45075  High Eval                            Mins  72407  Cash Pay Eval 40    Timed Treatment:   -   mins   Total Treatment:     40   mins      PT: Marty Magana PT     License Number: [id number]  Electronically signed by Marty Magana PT, 01/19/23, 3:12 PM EST    Certification Period: 1/20/2023 thru 4/19/2023  I certify that the therapy services are furnished while this patient is under my care.  The services outlined above are required by this patient, and will be reviewed every 90 days.         Physician Signature:__________________________________________________    PHYSICIAN:   NPI:                                       DATE:      Please sign and return via fax to [email or web address] . Thank you, [facility].

## 2023-01-27 ENCOUNTER — TREATMENT (OUTPATIENT)
Dept: PHYSICAL THERAPY | Facility: CLINIC | Age: 28
End: 2023-01-27

## 2023-01-27 DIAGNOSIS — M54.2 CERVICALGIA: Primary | ICD-10-CM

## 2023-01-27 DIAGNOSIS — G89.29 CHRONIC BILATERAL LOW BACK PAIN WITH LEFT-SIDED SCIATICA: ICD-10-CM

## 2023-01-27 DIAGNOSIS — M54.42 CHRONIC BILATERAL LOW BACK PAIN WITH LEFT-SIDED SCIATICA: ICD-10-CM

## 2023-01-27 PROCEDURE — 20561 NDL INSJ W/O NJX 3+ MUSC: CPT | Performed by: PHYSICAL THERAPIST

## 2023-01-27 NOTE — PROGRESS NOTES
Physical Therapy Daily Note    Patient: Mariama Mallory   : 1995  Diagnosis/ICD-10 Code:  Cervicalgia [M54.2]  Referring practitioner: No ref. provider found  Date of Initial Visit: Type: THERAPY  Noted: 2023  Today's Date: 2023  Patient seen for 2 session  Location of Service: [address] [place] [address]       Visit Diagnoses:    ICD-10-CM ICD-9-CM   1. Cervicalgia  M54.2 723.1   2. Chronic bilateral low back pain with left-sided sciatica  M54.42 724.2    G89.29 724.3     338.29            Subjective  Mariama Mallory reported today that ***. Pt was agreeable and gave consent for dry needling today for treatment of their present symptoms.    Objective   No functional measures updated at today's visit unless otherwise stated. For functional assessment and documentation of patient progressions referred to the assessment section.    See Exercise, Manual, and Modality Logs for complete treatments.       Assessment/Plan  Treatment today consisted of a brief examination of the region to be treated followed by dry needling of the *** to address current limitations and impairments. Patient was educated on the effects of dry needling, and contraindications, risks, and post-treatment expectations prior to treatment and consent was obtained. Patient tolerated treatment well and without complaint. Patient left clinic in good rapport.    {DSPlan:93177}         Timed:         Manual Therapy:         mins  77027;     Therapeutic Exercise:         mins  02741;     Neuromuscular Neeraj:        mins  69334;    Therapeutic Activity:         mins  41355;     Gait Training:           mins  01182;     Ultrasound:          mins  82173;    Ionto                                   mins   15930  Self Care                            mins   26965  Canalith Repos         mins 54229    Un-Timed:  Electrical Stimulation:         mins  60474 ( );  Dry Needling     30      mins self-pay  Traction          mins 30058  Low Eval          Mins  32848  Mod Eval          Mins  49242  High Eval                            Mins  05873      Timed Treatment:   30   mins   Total Treatment:     30   mins      PT: Marty Magana PT     License Number: [id number]  Electronically signed by Marty Magana PT, 01/27/23, 10:36 AM EST

## 2023-01-31 NOTE — PROGRESS NOTES
Physical Therapy Daily Note    Patient: Mariama Mallory   : 1995  Diagnosis/ICD-10 Code:  Cervicalgia [M54.2]  Referring practitioner: No ref. provider found  Date of Initial Visit: Type: THERAPY  Noted: 2023  Today's Date: 2023  Patient seen for 2 session  Location of Service: [place] [address]       Visit Diagnoses:    ICD-10-CM ICD-9-CM   1. Cervicalgia  M54.2 723.1   2. Chronic bilateral low back pain with left-sided sciatica  M54.42 724.2    G89.29 724.3     338.29            Subjective  Mariama Mallory reported today that she's doing well . Pt was agreeable and gave consent for dry needling today for treatment of their present symptoms.    Objective   No functional measures updated at today's visit unless otherwise stated. For functional assessment and documentation of patient progressions referred to the assessment section.    See Exercise, Manual, and Modality Logs for complete treatments.       Assessment/Plan  Treatment today consisted of a brief examination of the region to be treated followed by dry needling of the lumbar spine to address current limitations and impairments. Patient was educated on the effects of dry needling, and contraindications, risks, and post-treatment expectations prior to treatment and consent was obtained. Patient tolerated treatment well and without complaint. Patient left clinic in good rapport.    Continue with current treatment plan and ongoing assessment; progress interventions to tolerance         Timed:         Manual Therapy:         mins  16509;     Therapeutic Exercise:         mins  23999;     Neuromuscular Neeraj:        mins  14401;    Therapeutic Activity:         mins  34389;     Gait Training:           mins  50641;     Ultrasound:          mins  24095;    Ionto                                   mins   77193  Self Care                            mins   07592  [facility]          mins 41145    Un-Timed:  Electrical Stimulation:         mins  21233 ( );  Dry Needling     30     mins self-pay  Traction          mins 10720  Low Eval          Mins  33938  Mod Eval          Mins  42088  High Eval                            Mins  03763      Timed Treatment:   30   mins   Total Treatment:     30   mins      PT: Marty Magana PT     License Number: [id number]  Electronically signed by Marty Magana PT, 01/31/23, 2:22 PM EST

## 2023-05-01 ENCOUNTER — OFFICE VISIT (OUTPATIENT)
Dept: FAMILY MEDICINE CLINIC | Facility: CLINIC | Age: 28
End: 2023-05-01
Payer: COMMERCIAL

## 2023-05-01 VITALS
OXYGEN SATURATION: 100 % | TEMPERATURE: 98.6 F | WEIGHT: 148.6 LBS | HEIGHT: 69 IN | HEART RATE: 94 BPM | BODY MASS INDEX: 22.01 KG/M2 | DIASTOLIC BLOOD PRESSURE: 72 MMHG | SYSTOLIC BLOOD PRESSURE: 108 MMHG

## 2023-05-01 DIAGNOSIS — M79.642 LEFT HAND PAIN: Primary | ICD-10-CM

## 2023-05-01 PROCEDURE — 99213 OFFICE O/P EST LOW 20 MIN: CPT | Performed by: NURSE PRACTITIONER

## 2023-05-01 NOTE — PROGRESS NOTES
"Chief Complaint  Hand Pain and Ear Problem (Right ear, external)    Subjective        Mariama Mallory presents to [facility]  History of Present Illness    Patient presents today with 2 different complaints to me.    Has a pain at base of left middle finger. Doesn't hurt all the time, but has a spot that with any touching it is sore.  For last month it has been bothering.   Doesn't hurt all the time, just with touching.   Didn't have any fall or injury.   Is right handed.        Also has some pain in right ear externally.  Started yesterday    Objective   Vital Signs:  /72   Pulse 94   Temp 98.6 °F (37 °C)   Ht 175.3 cm (69\")   Wt 67.4 kg (148 lb 9.6 oz)   SpO2 100%   BMI 21.94 kg/m²   Estimated body mass index is 21.94 kg/m² as calculated from the following:    Height as of this encounter: 175.3 cm (69\").    Weight as of this encounter: 67.4 kg (148 lb 9.6 oz).       BMI is within normal parameters. No other follow-up for BMI required.      Physical Exam  Constitutional:       Appearance: Normal appearance.   HENT:      Head: Normocephalic and atraumatic.      Right Ear: External ear normal. No tenderness. There is no impacted cerumen. No foreign body. Tympanic membrane is not injected or erythematous. Tympanic membrane has normal mobility.   Cardiovascular:      Rate and Rhythm: Normal rate and regular rhythm.      Pulses: Normal pulses.   Pulmonary:      Effort: Pulmonary effort is normal.      Breath sounds: Normal breath sounds.   Musculoskeletal:      Left hand: Tenderness (with palpation of base of left third digit, ) present. Normal range of motion.   Skin:     General: Skin is warm and dry.   Neurological:      Mental Status: She is alert.   Psychiatric:         Mood and Affect: Mood normal.         Behavior: Behavior normal.         Thought Content: Thought content normal.         Judgment: Judgment normal.        Result Review :                   Assessment and " Plan   Diagnoses and all orders for this visit:    1. Left hand pain (Primary)  -     XR Hand 3+ View Left; Future    Ears appear to be normal.  If persistent notify provider.    We will proceed with left hand x-ray.  Will call with results any changes needed plan of care.  Follow-up as needed.  She verbalized understanding and is agreeable.         Follow Up   No follow-ups on file.  Patient was given instructions and counseling regarding her condition or for health maintenance advice. Please see specific information pulled into the AVS if appropriate.

## 2023-05-11 ENCOUNTER — TELEPHONE (OUTPATIENT)
Dept: FAMILY MEDICINE CLINIC | Facility: CLINIC | Age: 28
End: 2023-05-11
Payer: COMMERCIAL

## 2023-05-11 DIAGNOSIS — H91.93 BILATERAL HEARING LOSS, UNSPECIFIED HEARING LOSS TYPE: Primary | ICD-10-CM

## 2023-05-11 NOTE — TELEPHONE ENCOUNTER
Regarding: Hearing trouble   Contact: [phone]  ----- Message from Cherry Wtit MA sent at 5/11/2023  1:13 PM EDT -----  Please advise     ----- Message from Mariama Mallory to Aries Ruelas APRN sent at 5/11/2023  1:06 PM -----   They said I’ll need referall    Thanks!      ----- Message -----       From:Aries Ruelas       Sent:5/11/2023  1:05 PM EDT         To:Mariama Mallory    Subject:Hearing trouble     Yes that would be fine.  Do you need me to make referral for your insurance or do you want to call and set that up for yourself?      ----- Message -----       From:Mariama Mallory       Sent:5/11/2023 11:48 AM EDT         To:Aries Ruelas    Subject:Hearing trouble     I have had some hearing trouble over the last several years where I can hear but have trouble understanding (especially in slightly noisier environments) or while watching TV etc. Was wondering if I could get a hearing test done at  [facility]. Because they are in network for me?     Thank you!

## 2023-05-15 NOTE — PROGRESS NOTES
Subjective:     Patient ID: Mariama Mallory is a 28 y.o. female.    Chief Complaint:  Left hand pain, new patient exam  History of Present Illness  Mariama Mallory 28-year female presents to clinic for evaluation of her left upper extremity.  She is experiencing pain along the palmar aspect of the incision.  X-ray images were completed available for review in chart.  Rates discomfort exos 2 out of 10 dull in nature.  She is right-hand dominant, does pull reins on horse which does happen to cross the palmar aspect of hand the area of concern.  Denies any cystic structures present at any other location on either hand.  She is not experiencing locking catching or any significant discomfort.  Swelling is present palpable noticeable cystic structure.  Denies any other concerns present.    Social History     Occupational History   • Not on file   Tobacco Use   • Smoking status: Never   • Smokeless tobacco: Never   Vaping Use   • Vaping Use: Never used   Substance and Sexual Activity   • Alcohol use: No   • Drug use: Never   • Sexual activity: Yes     Partners: Male     Birth control/protection: None     Comment: Came off nexplanon (implant) August 2019      Past Medical History:   Diagnosis Date   • Elevated prolactin level    • Irritable bowel syndrome February 2020   • PCOS (polycystic ovarian syndrome)      Past Surgical History:   Procedure Laterality Date   • DENTAL PROCEDURE         Family History   Problem Relation Age of Onset   • No Known Problems Mother    • No Known Problems Father    • Diabetes type II Paternal Grandmother    • Diabetes type II Other         aunt               Objective:  Physical Exam    Vital signs reviewed.   General: No acute distress.  Eyes: conjunctiva clear; pupils equally round and reactive  ENT: external ears and nose atraumatic; oropharynx clear  CV: no peripheral edema  Resp: normal respiratory effort  Skin: no rashes or wounds; normal turgor  Psych: mood and affect appropriate;  "recent and remote memory intact    Vitals:    05/16/23 1342   Weight: 67.1 kg (148 lb)   Height: 175.3 cm (69\")         05/16/23  1342   Weight: 67.1 kg (148 lb)     Body mass index is 21.86 kg/m².      Left Hand Exam     Comments:  Flex/extend all digits left hand with 5 out of 5 strength  Palpable cystic structure along the flexor tendon of the long finger palpable, no evidence of locking, catching no significant tenderness to palpate along the palmar aspect of the hand  Brisk cap refill all digits left hand  2+ distal radius pulse  Negative tenderness along A1 pulley                 Imaging:  Independently reviewed three-view x-ray images left hand no acute evidence of fracture dislocation or other acute osseous abnormality    Assessment:        1. Ganglion cyst of flexor tendon sheath of finger, left           Plan:  1. Discussed plan of care with patient.  We did discuss loosening the rains or changing her  with use of rains when riding the horse.  Discussed to decrease palpable irritation including rubbing over the cystic structure to see if this will allow it to calm down.  At this time she is not experiencing locking or catching the flexor tendon we will hold off on any referral for surgical intervention.  Discussed if she is continued to experience the swelling but also pain I would recommend application of diclofenac over-the-counter but otherwise would change  strength to see if this will decrease some of the swelling.  Gladly see her back in clinic as needed.  All questions answered.  Orders:  No orders of the defined types were placed in this encounter.    No orders of the defined types were placed in this encounter.            Yared dictation utilized    "

## 2023-05-16 ENCOUNTER — OFFICE VISIT (OUTPATIENT)
Dept: ORTHOPEDIC SURGERY | Facility: CLINIC | Age: 28
End: 2023-05-16
Payer: COMMERCIAL

## 2023-05-16 VITALS — HEIGHT: 69 IN | BODY MASS INDEX: 21.92 KG/M2 | WEIGHT: 148 LBS

## 2023-05-16 DIAGNOSIS — M67.442 GANGLION CYST OF FLEXOR TENDON SHEATH OF FINGER, LEFT: Primary | ICD-10-CM

## 2023-05-16 PROCEDURE — 99214 OFFICE O/P EST MOD 30 MIN: CPT | Performed by: NURSE PRACTITIONER

## 2023-06-02 ENCOUNTER — TELEPHONE (OUTPATIENT)
Dept: ORTHOPEDIC SURGERY | Facility: CLINIC | Age: 28
End: 2023-06-02

## 2023-06-02 NOTE — TELEPHONE ENCOUNTER
Detailed message left for patient per Rama she is happy to see the patient back in the clinic to further discuss possible aspiration/injection of the cyst.    Please call the office to be scheduled as a follow up.    Thanks.

## 2023-06-06 ENCOUNTER — OFFICE VISIT (OUTPATIENT)
Dept: ORTHOPEDIC SURGERY | Facility: CLINIC | Age: 28
End: 2023-06-06
Payer: COMMERCIAL

## 2023-06-06 DIAGNOSIS — M67.442 GANGLION CYST OF FLEXOR TENDON SHEATH OF FINGER, LEFT: Primary | ICD-10-CM

## 2023-06-06 NOTE — PROGRESS NOTES
Subjective:     Patient ID: Mariama Mallory is a 28 y.o. female.    Chief Complaint:  Follow-up ganglion cyst flexor tendon sheath, left hand long finger  History of Present Illness  Mariama Mallory   Returns to clinic for follow-up of her left upper extremity.  She over the last few weeks has noted increased pain with gripping rains of her horse.  She has tried adjusting her  does continue to wear some protective gloves however continues to experience pain and increased swelling along the palmar aspect of the third digit left hand.  Palpable swelling cystic structure noted.  Over the last 24 hours the cyst to see him to decrease in size.  Denies any significant pain with motion such as flexion extension denies any presence of numbness or tingling at the left upper extremity.  Denies any other concerns present.       Social History     Occupational History    Not on file   Tobacco Use    Smoking status: Never     Passive exposure: Never    Smokeless tobacco: Never   Vaping Use    Vaping Use: Never used   Substance and Sexual Activity    Alcohol use: No    Drug use: Never    Sexual activity: Yes     Partners: Male     Birth control/protection: None     Comment: Came off nexplanon (implant) August 2019      Past Medical History:   Diagnosis Date    Elevated prolactin level     Irritable bowel syndrome February 2020    PCOS (polycystic ovarian syndrome)      Past Surgical History:   Procedure Laterality Date    DENTAL PROCEDURE         Family History   Problem Relation Age of Onset    No Known Problems Mother     No Known Problems Father     Diabetes type II Paternal Grandmother     Diabetes type II Other         aunt               Objective:  Physical Exam    General: No acute distress.  Eyes: conjunctiva clear; pupils equally round and reactive  ENT: external ears and nose atraumatic; oropharynx clear  CV: no peripheral edema  Resp: normal respiratory effort  Skin: no rashes or wounds; normal turgor  Psych:  mood and affect appropriate; recent and remote memory intact    There were no vitals filed for this visit.  There were no vitals filed for this visit.  There is no height or weight on file to calculate BMI.      Ortho Exam     Left hand examined  Palpable cystic structure along the palmar aspect of the hand, third digit  Flex/extend all digits left hand with 5 out of 5  strength  Brisk cap refill all digits left hand  2+ distal radius pulse  Positive sensation along the palmar and dorsum of the hand including all digits    Assessment:        1. Ganglion cyst of flexor tendon sheath of finger, left           Plan:  Small Joint Arthrocentesis  Consent given by: patient  Site marked: site marked  Timeout: Immediately prior to procedure a time out was called to verify the correct patient, procedure, equipment, support staff and site/side marked as required   Supporting Documentation  Indications: pain   Procedure Details  Location: long finger - Long finger joint: palmar aspect.  Preparation: Patient was prepped and draped in the usual sterile fashion  Needle gauge: 18g.  Approach: medial    Small Joint Arthrocentesis  Consent given by: patient  Site marked: site marked  Timeout: Immediately prior to procedure a time out was called to verify the correct patient, procedure, equipment, support staff and site/side marked as required   Supporting Documentation  Indications: pain   Procedure Details  Location: long finger - Long finger joint: palmar aspect.  Preparation: Patient was prepped and draped in the usual sterile fashion  Needle gauge: 18g.  Approach: medial      1.  Discussed plan of care with patient.  We did discuss proceeding with aspiration only cystic structure of the palmar aspect of the third digit which she does wish to proceed with.  I do recommend application of ice to injection site this evening, clear jellylike fluid removed from cystic structure no tenderness to palpate after fluid removal.  We  will plan to see her back in clinic as needed.  All questions answered to his visit.  Orders:  Orders Placed This Encounter   Procedures    Small Joint Arthrocentesis     No orders of the defined types were placed in this encounter.              Yared dictation utilized